# Patient Record
(demographics unavailable — no encounter records)

---

## 2024-11-19 NOTE — ASSESSMENT
[FreeTextEntry1] : Referring physician: Dr. Qasim Ram  Date: 11/19/2024  Reason for referral right inguinal hernia  This is a 71-year-old gentleman who in February or March of this year was moving some heavy logs.  He thinks this may have led to cause the event of him having a hernia in the right groin.  He now complains of having a lump in the right groin for the past 3 months.  The lump is essentially unchanged in size.  States he has occasional discomfort in the right groin when he does heavy lifting or Valsalva type maneuvering.  He has had no bouts of nausea or vomiting or change in bowel habits.  Patient denies a history of prostate cance but has had a green laser procedure for BPH. he has had a previous lap sebastian back in 2015 done in [facility].  Patient states that at that time he had pancreatitis and ultimately was in the hospital after the gallbladder removed for 10 days.  He denies other abdominal surgeries.  Physical examination: Patient examined erect and supine position.  Abdomen soft nontender nondistended he has an obvious right inguinal hernia of large size.  The hernia is reducible in the supine position.  The right scrotum and testicles within normal limits.  No significant findings in the left groin except for some laxity and a small hernia cannot be entirely ruled out.  Left scrotum and testicles within normal limits.  Impression/plan right inguinal hernia: This is a 71-year-old gentleman who is symptomatic from a right inguinal hernia.  At this point he wished to have his hernia repaired.  He and I had a long conversation regarding the various surgical approaches such as laparoscopic robotic and open we have also talked about with and without mesh type repairs and the various types of mesh.  Patient will be scheduled for bilateral lap inguinal hernia repair with mesh indications alternatives and complication of procedure discussed questions answered written consent obtained the office today.

## 2025-02-06 NOTE — ASSESSMENT
[FreeTextEntry1] : Patient presents today for follow-up after bilateral lap inguinal hernia repair patient without complaints and very happy with results.  No history of fever chills or sweats.  No pain.  Physical examination: Patient examined erect and supine position.  The abdomen soft nontender nondistended.  Incisions well-healed no evidence infection hematoma seroma or ecchymosis.  He has no seromas in either groin scrotum and testicles within normal limits testicles nontender bilaterally he has no evidence recurrence.  Status post bilateral lap inguinal herniorrhaphy patient doing surgically no acute findings follow-up with me on appearing basis

## 2025-07-23 NOTE — HISTORY OF PRESENT ILLNESS
[FreeTextEntry1] : Mr Rowe has been followed here since 2009 for cardiac risks, his CTA showed nonocclusive CAD 2018.   Since last visit he has not been hospitalized.    He had  hernia surgery. 1/2025  Dr Xena Lopez. Ater  he had a UTI, had an ER visit [facility].    He denies chest pain, dyspnea, palpitations or syncope. He has sinus issues from pollen.   He is active around the house; he walks the dog 3/4 mile/day.    .

## 2025-07-23 NOTE — ADDENDUM
[FreeTextEntry1] : stress echo is nonischemic, There is no evidence of active ischemia or CHF, there is no cardiac contraindication to planned procedure/surgery.